# Patient Record
Sex: MALE | Race: ASIAN | NOT HISPANIC OR LATINO | Employment: UNEMPLOYED | URBAN - METROPOLITAN AREA
[De-identification: names, ages, dates, MRNs, and addresses within clinical notes are randomized per-mention and may not be internally consistent; named-entity substitution may affect disease eponyms.]

---

## 2018-08-28 ENCOUNTER — RADIANT APPOINTMENT (OUTPATIENT)
Dept: GENERAL RADIOLOGY | Facility: CLINIC | Age: 38
End: 2018-08-28
Attending: NURSE PRACTITIONER

## 2018-08-28 ENCOUNTER — OFFICE VISIT (OUTPATIENT)
Dept: URGENT CARE | Facility: URGENT CARE | Age: 38
End: 2018-08-28

## 2018-08-28 VITALS
RESPIRATION RATE: 16 BRPM | OXYGEN SATURATION: 98 % | DIASTOLIC BLOOD PRESSURE: 100 MMHG | HEART RATE: 67 BPM | TEMPERATURE: 97.7 F | SYSTOLIC BLOOD PRESSURE: 146 MMHG | WEIGHT: 153 LBS

## 2018-08-28 DIAGNOSIS — R10.13 ABDOMINAL PAIN, EPIGASTRIC: ICD-10-CM

## 2018-08-28 DIAGNOSIS — S30.812A PENIS ABRASION, INITIAL ENCOUNTER: ICD-10-CM

## 2018-08-28 DIAGNOSIS — K21.9 GASTROESOPHAGEAL REFLUX DISEASE, ESOPHAGITIS PRESENCE NOT SPECIFIED: Primary | ICD-10-CM

## 2018-08-28 LAB
ALBUMIN UR-MCNC: NEGATIVE MG/DL
APPEARANCE UR: CLEAR
BILIRUB UR QL STRIP: NEGATIVE
COLOR UR AUTO: YELLOW
GLUCOSE UR STRIP-MCNC: NEGATIVE MG/DL
HGB UR QL STRIP: NEGATIVE
KETONES UR STRIP-MCNC: NEGATIVE MG/DL
LEUKOCYTE ESTERASE UR QL STRIP: NEGATIVE
NITRATE UR QL: NEGATIVE
PH UR STRIP: 6.5 PH (ref 5–7)
RBC #/AREA URNS AUTO: NORMAL /HPF
SOURCE: NORMAL
SP GR UR STRIP: <=1.005 (ref 1–1.03)
UROBILINOGEN UR STRIP-ACNC: 0.2 EU/DL (ref 0.2–1)
WBC #/AREA URNS AUTO: NORMAL /HPF

## 2018-08-28 PROCEDURE — 74019 RADEX ABDOMEN 2 VIEWS: CPT | Mod: FY

## 2018-08-28 PROCEDURE — 83013 H PYLORI (C-13) BREATH: CPT | Mod: 90 | Performed by: NURSE PRACTITIONER

## 2018-08-28 PROCEDURE — 99000 SPECIMEN HANDLING OFFICE-LAB: CPT | Performed by: NURSE PRACTITIONER

## 2018-08-28 PROCEDURE — 99204 OFFICE O/P NEW MOD 45 MIN: CPT | Performed by: NURSE PRACTITIONER

## 2018-08-28 PROCEDURE — 81001 URINALYSIS AUTO W/SCOPE: CPT | Performed by: NURSE PRACTITIONER

## 2018-08-28 ASSESSMENT — ENCOUNTER SYMPTOMS
BLOOD IN STOOL: 0
DIARRHEA: 0
ACTIVITY CHANGE: 0
APPETITE CHANGE: 1
ABDOMINAL DISTENTION: 1
FEVER: 0
FATIGUE: 1
MUSCULOSKELETAL NEGATIVE: 1
NAUSEA: 1
ABDOMINAL PAIN: 1
CONSTIPATION: 0
DYSURIA: 0

## 2018-08-28 ASSESSMENT — PAIN SCALES - GENERAL: PAINLEVEL: MODERATE PAIN (5)

## 2018-08-28 NOTE — MR AVS SNAPSHOT
After Visit Summary   8/28/2018    Darryl Melo    MRN: [id number]           Patient Information     Date Of Birth          1980        Visit Information        Provider Department      8/28/2018 5:20 PM Juan C Salcedo APRN [facility]        Today's Diagnoses     Gastroesophageal reflux disease, esophagitis presence not specified    -  1    Penis abrasion, initial encounter        Abdominal pain, epigastric          Care Instructions      GERD (Adult)    The esophagus is a tube that carries food from the mouth to the stomach. A valve at the lower end of the esophagus prevents stomach acid from flowing upward. When this valve doesn't work properly, stomach contents may repeatedly flow back up (reflux) into the esophagus. This is called gastroesophageal reflux disease (GERD). GERD can irritate the esophagus. It can cause problems with swallowing or breathing. In severe cases, GERD can cause recurrent pneumonia or other serious problems.  Symptoms of reflux include burning, pressure or sharp pain in the upper abdomen or mid to lower chest. The pain can spread to the neck, back, or shoulder. There may be belching, an acid taste in the back of the throat, chronic cough, or sore throat or hoarseness. GERD symptoms often occur during the day after a big meal. They can also occur at night when lying down.   Home care  Lifestyle changes can help reduce symptoms. If needed, medicines may be prescribed. Symptoms often improve with treatment, but if treatment is stopped, the symptoms often return after a few months. So most persons with GERD will need to continue treatment.  Lifestyle changes    Limit or avoid fatty, fried, and spicy foods, as well as coffee, chocolate, mint, and foods with high acid content such as tomatoes and citrus fruit and juices (orange, grapefruit, lemon).    Don t eat large meals, especially at night. Frequent, smaller meals are best. Do not lie down  "right after eating. And don t eat anything 3 hours before going to bed.    Avoid drinking alcohol and smoking. As much as possible, stay away from second hand smoke.    If you are overweight, losing weight will reduce symptoms.     Avoid wearing tight clothing around your stomach area.    If your symptoms occur during sleep, use a foam wedge to elevate your upper body (not just your head.) Or, place 4\" blocks under the head of your bed.  Medicines  If needed, medicines can help relieve the symptoms of GERD and prevent damage to the esophagus. Discuss a medicine plan with your healthcare provider. This may include one or more of the following medicines:    Antacids to help neutralize the normal acids in your stomach.    Acid blockers (H2 blockers) to decrease acid production.    Acid inhibitors (PPIs) to decrease acid production in a different way than the blockers. They may work better, but can take a little longer to take effect.  Take an antacid 30-60 minutes after eating and at bedtime, but not at the same time as an acid blocker.  Try not to take medicines such as ibuprofen and aspirin. If you are taking aspirin for your heart or other medical reasons, talk to your healthcare provider about stopping it.  Follow-up care  Follow up with your healthcare provider or as advised by our staff.  When to seek medical advice  Call your healthcare provider if any of the following occur:    Stomach pain gets worse or moves to the lower right abdomen (appendix area)    Chest pain appears or gets worse, or spreads to the back, neck, shoulder, or arm    Frequent vomiting (can t keep down liquids)    Blood in the stool or vomit (red or black in color)    Feeling weak or dizzy    Fever of 100.4 F (38 C) or higher, or as directed by your healthcare provider  Date Last Reviewed: 6/23/2015 2000-2017 [employer]. [address] [place] [address], [place] [address]. All rights reserved. This information is not intended as a " substitute for professional medical care. Always follow your healthcare professional's instructions.        Lifestyle Changes for Controlling GERD  When you have GERD, stomach acid feels as if it s backing up toward your mouth. Whether or not you take medicine to control your GERD, your symptoms can often be improved with lifestyle changes. Talk to your healthcare provider about the following suggestions. These suggestions may help you get relief from your symptoms.      Raise your head  Reflux is more likely to strike when you re lying down flat, because stomach fluid can flow backward more easily. Raising the head of your bed 4 to 6 inches can help. To do this:    Slide blocks or books under the legs at the head of your bed. Or, place a wedge under the mattress. [employer] can make a suitable wedge for you. The wedge should run from your waist to the top of your head.    Don t just prop your head on several pillows. This increases pressure on your stomach. It can make GERD worse.  Watch your eating habits  Certain foods may increase the acid in your stomach or relax the lower esophageal sphincter. This makes GERD more likely. It s best to avoid the following if they cause you symptoms:    Coffee, tea, and carbonated drinks (with and without caffeine)    Fatty, fried, or spicy food    Mint, chocolate, onions, and tomatoes    Peppermint    Any other foods that seem to irritate your stomach or cause you pain  Relieve the pressure  Tips include the following:    Eat smaller meals, even if you have to eat more often.    Don t lie down right after you eat. Wait a few hours for your stomach to empty.    Avoid tight belts and tight-fitting clothes.    Lose excess weight.  Tobacco and alcohol  Avoid smoking tobacco and drinking alcohol. They can make GERD symptoms worse.  Date Last Reviewed: 7/1/2016 2000-2017 [employer]. [address] [place] [address], [place] [address]. All rights reserved. This  information is not intended as a substitute for professional medical care. Always follow your healthcare professional's instructions.        Medicines for GERD  Gastroesophageal reflux disease (GERD) can be treated with medicine. This may be done with a medicine you can buy over the counter. Or it may be done with a medicine that your healthcare provider has to prescribe. In some cases, both types may be used. Your provider will tell you what is best for your symptoms.  Antacids  Antacids work to weaken the acid in your stomach and can give you quick relief. You can buy many of them with no prescription. Antacids can be high in sodium. This may be a problem if you have high blood pressure. Some antacids also have aluminum. This should be avoided if you have long-term (chronic) kidney disease. So check with your provider first. Take antacids only when you need to, as advised by your provider.  Side effects: Constipation, diarrhea. If you take too much medicine, it can cause calcium to build up.   H-2 blockers  H-2 blockers cause the stomach to make less acid. They are often used both on demand as symptoms occur, and daily to keep symptoms away. Your provider may prescribe them if antacids don t work for you. You can buy some of them over the counter. These come in a lower dosage.  Side effects: Confusion in older adults  Proton-pump inhibitors  These also cause the stomach to make less acid. They reduce stomach acid more than H-2 blockers. They may be used for a short time, or longer to treat certain conditions. You can buy some of them over the counter. Or your provider may prescribe them. They help control GERD symptoms.  Side effects: Belly (abdominal) pain, diarrhea, upset stomach (nausea). Possible other side effects linked to long-term use and high doses.  Prokinetics  These medicines affect the movement of the digestive tract. They may be recommended if your stomach is emptying too slowly. But in most cases  they are not recommended for treating GERD.   Side effects: Tiredness, depression, anxiety, problems with physical movement, belly cramps, constipation, diarrhea, a jittery feeling  Medicines to avoid  Don t take aspirin without your provider s approval. And don t take a nonsteroidal anti-inflammatory drug (NSAID), such as ibuprofen. These reduce the protective lining of your stomach. This can lead to more GERD symptoms. Check with your provider or pharmacist before taking a new medicine.   Date Last Reviewed: 7/1/2016 2000-2017 [employer]. [address] [place], [place] [id number]. All rights reserved. This information is not intended as a substitute for professional medical care. Always follow your healthcare professional's instructions.        Understanding H. pylori and Ulcers     An ulcer can form in two areas of the digestive tract; the stomach and the duodenum (where the stomach meets the small intestine).     Traditionally, ulcers, or sores in the lining of your digestive tract, were thought to be caused by too much spicy food, stress, or an anxious personality. We now know that most ulcers are probably due to infection with bacteria known as Helicobacter pylori (H. pylori).  Common ulcer symptoms  These include the following:    Burning, cramping, or hungerlike pain in the stomach area, often 1 to 3 hours after a meal or in the middle of the night    Pain that gets better or worse with eating    Nausea or vomiting    Black, tarry, or bloody stools (which means the ulcer is bleeding)  Or you may have no symptoms.  Your evaluation  An evaluation by your healthcare provider can show if you have an ulcer and determine whether it was caused by H. pylori. Your healthcare provider may ask you questions, examine you, and possibly do some tests. These may include:    A special X-ray called an upper gastrointestinal series, to help locate an ulcer. Before the test, you drink a chalky liquid, called  barium. This liquid helps the ulcer show up on the X-ray.    An endoscopic exam, done with a long tube with a camera on the end. The tube is passed through your mouth into your stomach, and allows the healthcare provider to get a closer look at your ulcer. You will be lightly sedated for this procedure. Your healthcare provider can also take a tissue sample to test for H. pylori.    Blood, stool, and breath tests are also available to show whether you have H. pylori in your digestive tract.  Your treatment  To kill H. pylori so your ulcer can heal, your healthcare provider will probably prescribe antibiotics. Other ulcer medicines that help reduce stomach acid may also be prescribed as well. Testing after treatment may be recommended to be sure the H. pylori infection is gone. Usually, killing H. pylori helps keep the ulcer from returning. However, you can develop ulcers more than once in your lifetime.   Date Last Reviewed: 7/1/2016 2000-2017 [employer]. [address] [place] [address]. All rights reserved. This information is not intended as a substitute for professional medical care. Always follow your healthcare professional's instructions.                Follow-ups after your visit        Follow-up notes from your care team     Return in about 2 weeks (around 9/11/2018), or if symptoms worsen or fail to improve.      Who to contact     If you have questions or need follow up information about today's clinic visit or your schedule please contact [facility] directly at [phone].  Normal or non-critical lab and imaging results will be communicated to you by MyChart, letter or phone within 4 business days after the clinic has received the results. If you do not hear from us within 7 days, please contact the clinic through MyChart or phone. If you have a critical or abnormal lab result, we will notify you by phone as soon as possible.  Submit refill requests  "through [employer] or call your pharmacy and they will forward the refill request to us. Please allow 3 business days for your refill to be completed.          Additional Information About Your Visit        [employer] Information     [employer] lets you send messages to your doctor, view your test results, renew your prescriptions, schedule appointments and more. To sign up, go to [email or web address] . Click on \"Log in\" on the left side of the screen, which will take you to the Welcome page. Then click on \"Sign up Now\" on the right side of the page.     You will be asked to enter the access code listed below, as well as some personal information. Please follow the directions to create your username and password.     Your access code is: [email or web address]  Expires: 2018  7:43 PM     Your access code will  in 90 days. If you need help or a new code, please call your [facility] clinic or [phone].        Care EveryWhere ID     This is your Care EveryWhere ID. This could be used by other organizations to access your [facility] medical records  [id number]        Your Vitals Were     Pulse Temperature Respirations Pulse Oximetry          67 97.7  F (36.5  C) (Oral) 16 98%         Blood Pressure from Last 3 Encounters:   18 (!) 146/100   11 102/60    Weight from Last 3 Encounters:   18 153 lb (69.4 kg)   11 157 lb (71.2 kg)              We Performed the Following     Helicobacter pylori Breath Test Adult     UA with Microscopic reflex to Culture          Today's Medication Changes          These changes are accurate as of 18  7:43 PM.  If you have any questions, ask your nurse or doctor.               These medicines have changed or have updated prescriptions.        Dose/Directions    * ranitidine 300 MG tablet   Commonly known as:  ZANTAC   This may have changed:  You were already taking a medication with the same name, and this prescription was added. Make sure you understand how and " when to take each.   Used for:  Gastroesophageal reflux disease, esophagitis presence not specified   Changed by:  Juan C Salcedo APRN CNP        Dose:  300 mg   Take 1 tablet (300 mg) by mouth At Bedtime   Quantity:  30 tablet   Refills:  1       * RANITIDINE HCL PO   This may have changed:  Another medication with the same name was added. Make sure you understand how and when to take each.   Used for:  Abdominal pain, epigastric   Changed by:  AliBrittumasanena Bishara APRN CNP        Refills:  0       * Notice:  This list has 2 medication(s) that are the same as other medications prescribed for you. Read the directions carefully, and ask your doctor or other care provider to review them with you.         Where to get your medicines      These medications were sent to [facility] [place] - [address]  [address], [facility] [address]     Phone:  [phone]     ranitidine 300 MG tablet                Primary Care Provider Fax #    Physician No Ref-Primary [phone]       No address on file        Equal Access to Services     [facility]: Hadii cordell monroy hadasho Soomaali, waaxda luqadaha, qaybta kaalmada adeegyada, apolinar lara . So [facility] [phone].    ATENCIÓN: Si habla español, tiene a aquino disposición servicios gratuitos de asistencia lingüística. Llame al [phone].    We comply with applicable federal civil rights laws and [place] laws. We do not discriminate on the basis of race, color, national origin, age, disability, sex, sexual orientation, or gender identity.            Thank you!     Thank you for choosing [facility]  for your care. Our goal is always to provide you with excellent care. Hearing back from our patients is one way we can continue to improve our services. Please take a few minutes to complete the written survey that you may receive in the mail after your visit with us. Thank you!              Your Updated Medication List - Protect others around you: Learn how to safely use, store and throw away your medicines at www.disposemymeds.org.          This list is accurate as of 8/28/18  7:43 PM.  Always use your most recent med list.                   Brand Name Dispense Instructions for use Diagnosis    NO ACTIVE MEDICATIONS       GERD (gastroesophageal reflux disease)       omeprazole 40 MG capsule    priLOSEC    30 capsule    Take 1 capsule by mouth daily.    GERD (gastroesophageal reflux disease)       PEPTO-BISMOL PO       Abdominal pain, epigastric       * ranitidine 300 MG tablet    ZANTAC    30 tablet    Take 1 tablet (300 mg) by mouth At Bedtime    Gastroesophageal reflux disease, esophagitis presence not specified       * RANITIDINE HCL PO       Abdominal pain, epigastric       * Notice:  This list has 2 medication(s) that are the same as other medications prescribed for you. Read the directions carefully, and ask your doctor or other care provider to review them with you.

## 2018-08-28 NOTE — PROGRESS NOTES
SUBJECTIVE:   Darryl Melo is a 38 year old male presenting with a chief complaint of   Chief Complaint   Patient presents with     Abdominal Pain     x1 week with pressure     Nausea       He is a new patient of [facility].    Abdominal Pain    Location: generalized   Radiation: None.    Pain character: pressure and intermittent stabbing,   Severity: 6 on a scale of 1-10.    Duration: 1 week   Course of Illness: slowly progressive.  Exacerbated by: hunger, ETOH and soda worsens symptoms   Relieved by: food, eating meals make symptoms improve  Associated Symptoms: nausea, bloating, belching and headache.  Female : Not applicable  Surgical History: none  Patient does endorse history of H. Pylori, concern for this reoccurrence, requests testing. Patient does note no PCP due to insurance coverage. He does endorse taking PeptoBismal OTC, Prilosec 20mg once daily OTC for 4 days and then switched to Ranitidine for 4 days. Reports taking fiber supplement with a large soft BM today.   Denies any hematemesis and hematochezia.   Of note, also requesting evaluation of a penile skin tear, no STD concerns    Review of Systems   Constitutional: Positive for appetite change and fatigue. Negative for activity change and fever.   HENT: Negative.    Gastrointestinal: Positive for abdominal distention, abdominal pain and nausea. Negative for blood in stool, constipation and diarrhea.        Diffuse discomfort   Genitourinary: Negative for dysuria.   Musculoskeletal: Negative.    Skin: Negative.    All other systems reviewed and are negative.      History reviewed. No pertinent past medical history.  History reviewed. No pertinent family history.  Current Outpatient Prescriptions   Medication Sig Dispense Refill     Bismuth Subsalicylate (PEPTO-BISMOL PO)        ranitidine (ZANTAC) 300 MG tablet Take 1 tablet (300 mg) by mouth At Bedtime 30 tablet 1     RANITIDINE HCL PO        NO ACTIVE MEDICATIONS        omeprazole (PRILOSEC) 40 MG  capsule Take 1 capsule by mouth daily. (Patient not taking: Reported on 8/28/2018) 30 capsule 1     Social History   Substance Use Topics     Smoking status: Never Smoker     Smokeless tobacco: Never Used     Alcohol use Not on file       OBJECTIVE  BP (!) 146/100 (BP Location: Left arm, Patient Position: Chair, Cuff Size: Adult Regular)  Pulse 67  Temp 97.7  F (36.5  C) (Oral)  Resp 16  Wt 153 lb (69.4 kg)  SpO2 98%    Physical Exam   Constitutional: He is oriented to person, place, and time. No distress.   Cardiovascular: Normal rate, regular rhythm, normal heart sounds and intact distal pulses.  Exam reveals no gallop.    No murmur heard.  Pulmonary/Chest: Effort normal and breath sounds normal. No respiratory distress. He has no wheezes.   Abdominal: Soft. He exhibits distension. He exhibits no mass. There is tenderness. There is no rebound and no guarding.   Hyperactive bowel sounds in all four quadrants.   Diffuse tenderness throughout abdomen noted with deep palpation     Genitourinary: Penis normal.   Genitourinary Comments: Penis shaft with 1x3cm area of healing abrasion, no erythema or edema noted       Neurological: He is alert and oriented to person, place, and time.   Skin: Skin is warm and dry.   Psychiatric: He has a normal mood and affect.       Labs:  Results for orders placed or performed in visit on 08/28/18 (from the past 24 hour(s))   UA with Microscopic reflex to Culture   Result Value Ref Range    Color Urine Yellow     Appearance Urine Clear     Glucose Urine Negative NEG^Negative mg/dL    Bilirubin Urine Negative NEG^Negative    Ketones Urine Negative NEG^Negative mg/dL    Specific Gravity Urine <=1.005 1.003 - 1.035    pH Urine 6.5 5.0 - 7.0 pH    Protein Albumin Urine Negative NEG^Negative mg/dL    Urobilinogen Urine 0.2 0.2 - 1.0 EU/dL    Nitrite Urine Negative NEG^Negative    Blood Urine Negative NEG^Negative    Leukocyte Esterase Urine Negative NEG^Negative    Source Midstream  Urine     WBC Urine 0 - 5 OTO5^0 - 5 /HPF    RBC Urine O - 2 OTO2^O - 2 /HPF         ASSESSMENT:    ICD-10-CM    1. Gastroesophageal reflux disease, esophagitis presence not specified K21.9 ranitidine (ZANTAC) 300 MG tablet   2. Penis abrasion, initial encounter S30.812A    3. Abdominal pain, epigastric R10.13 Bismuth Subsalicylate (PEPTO-BISMOL PO)     RANITIDINE HCL PO     Helicobacter pylori Breath Test Adult     UA with Microscopic reflex to Culture     XR Abdomen 2 Views          Medical Decision Making:    Differential Diagnosis:  Abdominal Pain: GERD/Ulcer, UTI and Non Specific    Serious Comorbid Conditions:  Adult:  None    PLAN: Discussed and reviewed lab findings. Negative urine and imaging. Clinical presentation most consistent with GERD, will treat with ranitidine 300gm at bedtime. Advised patient to monitor diet, avoid ETOH and soda. Discussed need to remain hydrated with a progressive bland diet and advance diet as tolerated.  Encouraged follow up for H. Pylori results. Advised that at this time no acute abdominal findings noted, however did discuss  red flag symptoms and when to return for follow up.   Also, briefly advised ongoing basic skin cares for penial abrasion.Patient agreed to the plan of care with no further questions or concerns.     Followup: If not improving or if condition worsens, follow up with your Primary Care Provider in the next 1-2 weeks or sooner as needed.    EVERT Eagle, CNP      Patient Instructions       GERD (Adult)    The esophagus is a tube that carries food from the mouth to the stomach. A valve at the lower end of the esophagus prevents stomach acid from flowing upward. When this valve doesn't work properly, stomach contents may repeatedly flow back up (reflux) into the esophagus. This is called gastroesophageal reflux disease (GERD). GERD can irritate the esophagus. It can cause problems with swallowing or breathing. In severe cases, GERD can cause  "recurrent pneumonia or other serious problems.  Symptoms of reflux include burning, pressure or sharp pain in the upper abdomen or mid to lower chest. The pain can spread to the neck, back, or shoulder. There may be belching, an acid taste in the back of the throat, chronic cough, or sore throat or hoarseness. GERD symptoms often occur during the day after a big meal. They can also occur at night when lying down.   Home care  Lifestyle changes can help reduce symptoms. If needed, medicines may be prescribed. Symptoms often improve with treatment, but if treatment is stopped, the symptoms often return after a few months. So most persons with GERD will need to continue treatment.  Lifestyle changes    Limit or avoid fatty, fried, and spicy foods, as well as coffee, chocolate, mint, and foods with high acid content such as tomatoes and citrus fruit and juices (orange, grapefruit, lemon).    Don t eat large meals, especially at night. Frequent, smaller meals are best. Do not lie down right after eating. And don t eat anything 3 hours before going to bed.    Avoid drinking alcohol and smoking. As much as possible, stay away from second hand smoke.    If you are overweight, losing weight will reduce symptoms.     Avoid wearing tight clothing around your stomach area.    If your symptoms occur during sleep, use a foam wedge to elevate your upper body (not just your head.) Or, place 4\" blocks under the head of your bed.  Medicines  If needed, medicines can help relieve the symptoms of GERD and prevent damage to the esophagus. Discuss a medicine plan with your healthcare provider. This may include one or more of the following medicines:    Antacids to help neutralize the normal acids in your stomach.    Acid blockers (H2 blockers) to decrease acid production.    Acid inhibitors (PPIs) to decrease acid production in a different way than the blockers. They may work better, but can take a little longer to take effect.  Take an " antacid 30-60 minutes after eating and at bedtime, but not at the same time as an acid blocker.  Try not to take medicines such as ibuprofen and aspirin. If you are taking aspirin for your heart or other medical reasons, talk to your healthcare provider about stopping it.  Follow-up care  Follow up with your healthcare provider or as advised by our staff.  When to seek medical advice  Call your healthcare provider if any of the following occur:    Stomach pain gets worse or moves to the lower right abdomen (appendix area)    Chest pain appears or gets worse, or spreads to the back, neck, shoulder, or arm    Frequent vomiting (can t keep down liquids)    Blood in the stool or vomit (red or black in color)    Feeling weak or dizzy    Fever of 100.4 F (38 C) or higher, or as directed by your healthcare provider  Date Last Reviewed: 6/23/2015 2000-2017 [employer]. [address] [place], [place] [id number]. All rights reserved. This information is not intended as a substitute for professional medical care. Always follow your healthcare professional's instructions.        Lifestyle Changes for Controlling GERD  When you have GERD, stomach acid feels as if it s backing up toward your mouth. Whether or not you take medicine to control your GERD, your symptoms can often be improved with lifestyle changes. Talk to your healthcare provider about the following suggestions. These suggestions may help you get relief from your symptoms.      Raise your head  Reflux is more likely to strike when you re lying down flat, because stomach fluid can flow backward more easily. Raising the head of your bed 4 to 6 inches can help. To do this:    Slide blocks or books under the legs at the head of your bed. Or, place a wedge under the mattress. [employer] can make a suitable wedge for you. The wedge should run from your waist to the top of your head.    Don t just prop your head on several pillows. This increases  pressure on your stomach. It can make GERD worse.  Watch your eating habits  Certain foods may increase the acid in your stomach or relax the lower esophageal sphincter. This makes GERD more likely. It s best to avoid the following if they cause you symptoms:    Coffee, tea, and carbonated drinks (with and without caffeine)    Fatty, fried, or spicy food    Mint, chocolate, onions, and tomatoes    Peppermint    Any other foods that seem to irritate your stomach or cause you pain  Relieve the pressure  Tips include the following:    Eat smaller meals, even if you have to eat more often.    Don t lie down right after you eat. Wait a few hours for your stomach to empty.    Avoid tight belts and tight-fitting clothes.    Lose excess weight.  Tobacco and alcohol  Avoid smoking tobacco and drinking alcohol. They can make GERD symptoms worse.  Date Last Reviewed: 7/1/2016 2000-2017 [employer]. [address] [place] [address]. All rights reserved. This information is not intended as a substitute for professional medical care. Always follow your healthcare professional's instructions.        Medicines for GERD  Gastroesophageal reflux disease (GERD) can be treated with medicine. This may be done with a medicine you can buy over the counter. Or it may be done with a medicine that your healthcare provider has to prescribe. In some cases, both types may be used. Your provider will tell you what is best for your symptoms.  Antacids  Antacids work to weaken the acid in your stomach and can give you quick relief. You can buy many of them with no prescription. Antacids can be high in sodium. This may be a problem if you have high blood pressure. Some antacids also have aluminum. This should be avoided if you have long-term (chronic) kidney disease. So check with your provider first. Take antacids only when you need to, as advised by your provider.  Side effects: Constipation, diarrhea. If you take too  much medicine, it can cause calcium to build up.   H-2 blockers  H-2 blockers cause the stomach to make less acid. They are often used both on demand as symptoms occur, and daily to keep symptoms away. Your provider may prescribe them if antacids don t work for you. You can buy some of them over the counter. These come in a lower dosage.  Side effects: Confusion in older adults  Proton-pump inhibitors  These also cause the stomach to make less acid. They reduce stomach acid more than H-2 blockers. They may be used for a short time, or longer to treat certain conditions. You can buy some of them over the counter. Or your provider may prescribe them. They help control GERD symptoms.  Side effects: Belly (abdominal) pain, diarrhea, upset stomach (nausea). Possible other side effects linked to long-term use and high doses.  Prokinetics  These medicines affect the movement of the digestive tract. They may be recommended if your stomach is emptying too slowly. But in most cases they are not recommended for treating GERD.   Side effects: Tiredness, depression, anxiety, problems with physical movement, belly cramps, constipation, diarrhea, a jittery feeling  Medicines to avoid  Don t take aspirin without your provider s approval. And don t take a nonsteroidal anti-inflammatory drug (NSAID), such as ibuprofen. These reduce the protective lining of your stomach. This can lead to more GERD symptoms. Check with your provider or pharmacist before taking a new medicine.   Date Last Reviewed: 7/1/2016 2000-2017 [employer]. [address] [place] [address], [place] [address]. All rights reserved. This information is not intended as a substitute for professional medical care. Always follow your healthcare professional's instructions.        Understanding H. pylori and Ulcers     An ulcer can form in two areas of the digestive tract; the stomach and the duodenum (where the stomach meets the small intestine).      Traditionally, ulcers, or sores in the lining of your digestive tract, were thought to be caused by too much spicy food, stress, or an anxious personality. We now know that most ulcers are probably due to infection with bacteria known as Helicobacter pylori (H. pylori).  Common ulcer symptoms  These include the following:    Burning, cramping, or hungerlike pain in the stomach area, often 1 to 3 hours after a meal or in the middle of the night    Pain that gets better or worse with eating    Nausea or vomiting    Black, tarry, or bloody stools (which means the ulcer is bleeding)  Or you may have no symptoms.  Your evaluation  An evaluation by your healthcare provider can show if you have an ulcer and determine whether it was caused by H. pylori. Your healthcare provider may ask you questions, examine you, and possibly do some tests. These may include:    A special X-ray called an upper gastrointestinal series, to help locate an ulcer. Before the test, you drink a chalky liquid, called barium. This liquid helps the ulcer show up on the X-ray.    An endoscopic exam, done with a long tube with a camera on the end. The tube is passed through your mouth into your stomach, and allows the healthcare provider to get a closer look at your ulcer. You will be lightly sedated for this procedure. Your healthcare provider can also take a tissue sample to test for H. pylori.    Blood, stool, and breath tests are also available to show whether you have H. pylori in your digestive tract.  Your treatment  To kill H. pylori so your ulcer can heal, your healthcare provider will probably prescribe antibiotics. Other ulcer medicines that help reduce stomach acid may also be prescribed as well. Testing after treatment may be recommended to be sure the H. pylori infection is gone. Usually, killing H. pylori helps keep the ulcer from returning. However, you can develop ulcers more than once in your lifetime.   Date Last Reviewed:  7/1/2016 2000-2017 [employer]. [address] [place] [address], [place] [address]. All rights reserved. This information is not intended as a substitute for professional medical care. Always follow your healthcare professional's instructions.

## 2018-08-29 NOTE — PATIENT INSTRUCTIONS
"  GERD (Adult)    The esophagus is a tube that carries food from the mouth to the stomach. A valve at the lower end of the esophagus prevents stomach acid from flowing upward. When this valve doesn't work properly, stomach contents may repeatedly flow back up (reflux) into the esophagus. This is called gastroesophageal reflux disease (GERD). GERD can irritate the esophagus. It can cause problems with swallowing or breathing. In severe cases, GERD can cause recurrent pneumonia or other serious problems.  Symptoms of reflux include burning, pressure or sharp pain in the upper abdomen or mid to lower chest. The pain can spread to the neck, back, or shoulder. There may be belching, an acid taste in the back of the throat, chronic cough, or sore throat or hoarseness. GERD symptoms often occur during the day after a big meal. They can also occur at night when lying down.   Home care  Lifestyle changes can help reduce symptoms. If needed, medicines may be prescribed. Symptoms often improve with treatment, but if treatment is stopped, the symptoms often return after a few months. So most persons with GERD will need to continue treatment.  Lifestyle changes    Limit or avoid fatty, fried, and spicy foods, as well as coffee, chocolate, mint, and foods with high acid content such as tomatoes and citrus fruit and juices (orange, grapefruit, lemon).    Don t eat large meals, especially at night. Frequent, smaller meals are best. Do not lie down right after eating. And don t eat anything 3 hours before going to bed.    Avoid drinking alcohol and smoking. As much as possible, stay away from second hand smoke.    If you are overweight, losing weight will reduce symptoms.     Avoid wearing tight clothing around your stomach area.    If your symptoms occur during sleep, use a foam wedge to elevate your upper body (not just your head.) Or, place 4\" blocks under the head of your bed.  Medicines  If needed, medicines can help relieve " the symptoms of GERD and prevent damage to the esophagus. Discuss a medicine plan with your healthcare provider. This may include one or more of the following medicines:    Antacids to help neutralize the normal acids in your stomach.    Acid blockers (H2 blockers) to decrease acid production.    Acid inhibitors (PPIs) to decrease acid production in a different way than the blockers. They may work better, but can take a little longer to take effect.  Take an antacid 30-60 minutes after eating and at bedtime, but not at the same time as an acid blocker.  Try not to take medicines such as ibuprofen and aspirin. If you are taking aspirin for your heart or other medical reasons, talk to your healthcare provider about stopping it.  Follow-up care  Follow up with your healthcare provider or as advised by our staff.  When to seek medical advice  Call your healthcare provider if any of the following occur:    Stomach pain gets worse or moves to the lower right abdomen (appendix area)    Chest pain appears or gets worse, or spreads to the back, neck, shoulder, or arm    Frequent vomiting (can t keep down liquids)    Blood in the stool or vomit (red or black in color)    Feeling weak or dizzy    Fever of 100.4 F (38 C) or higher, or as directed by your healthcare provider  Date Last Reviewed: 6/23/2015 2000-2017 [employer]. [address] [place] [address], [place] [address]. All rights reserved. This information is not intended as a substitute for professional medical care. Always follow your healthcare professional's instructions.        Lifestyle Changes for Controlling GERD  When you have GERD, stomach acid feels as if it s backing up toward your mouth. Whether or not you take medicine to control your GERD, your symptoms can often be improved with lifestyle changes. Talk to your healthcare provider about the following suggestions. These suggestions may help you get relief from your symptoms.      Raise your  head  Reflux is more likely to strike when you re lying down flat, because stomach fluid can flow backward more easily. Raising the head of your bed 4 to 6 inches can help. To do this:    Slide blocks or books under the legs at the head of your bed. Or, place a wedge under the mattress. Many foam stores can make a suitable wedge for you. The wedge should run from your waist to the top of your head.    Don t just prop your head on several pillows. This increases pressure on your stomach. It can make GERD worse.  Watch your eating habits  Certain foods may increase the acid in your stomach or relax the lower esophageal sphincter. This makes GERD more likely. It s best to avoid the following if they cause you symptoms:    Coffee, tea, and carbonated drinks (with and without caffeine)    Fatty, fried, or spicy food    Mint, chocolate, onions, and tomatoes    Peppermint    Any other foods that seem to irritate your stomach or cause you pain  Relieve the pressure  Tips include the following:    Eat smaller meals, even if you have to eat more often.    Don t lie down right after you eat. Wait a few hours for your stomach to empty.    Avoid tight belts and tight-fitting clothes.    Lose excess weight.  Tobacco and alcohol  Avoid smoking tobacco and drinking alcohol. They can make GERD symptoms worse.  Date Last Reviewed: 7/1/2016 2000-2017 [employer]. [address] [place] [address], [place] [address]. All rights reserved. This information is not intended as a substitute for professional medical care. Always follow your healthcare professional's instructions.        Medicines for GERD  Gastroesophageal reflux disease (GERD) can be treated with medicine. This may be done with a medicine you can buy over the counter. Or it may be done with a medicine that your healthcare provider has to prescribe. In some cases, both types may be used. Your provider will tell you what is best for your symptoms.  Antacids  Antacids  work to weaken the acid in your stomach and can give you quick relief. You can buy many of them with no prescription. Antacids can be high in sodium. This may be a problem if you have high blood pressure. Some antacids also have aluminum. This should be avoided if you have long-term (chronic) kidney disease. So check with your provider first. Take antacids only when you need to, as advised by your provider.  Side effects: Constipation, diarrhea. If you take too much medicine, it can cause calcium to build up.   H-2 blockers  H-2 blockers cause the stomach to make less acid. They are often used both on demand as symptoms occur, and daily to keep symptoms away. Your provider may prescribe them if antacids don t work for you. You can buy some of them over the counter. These come in a lower dosage.  Side effects: Confusion in older adults  Proton-pump inhibitors  These also cause the stomach to make less acid. They reduce stomach acid more than H-2 blockers. They may be used for a short time, or longer to treat certain conditions. You can buy some of them over the counter. Or your provider may prescribe them. They help control GERD symptoms.  Side effects: Belly (abdominal) pain, diarrhea, upset stomach (nausea). Possible other side effects linked to long-term use and high doses.  Prokinetics  These medicines affect the movement of the digestive tract. They may be recommended if your stomach is emptying too slowly. But in most cases they are not recommended for treating GERD.   Side effects: Tiredness, depression, anxiety, problems with physical movement, belly cramps, constipation, diarrhea, a jittery feeling  Medicines to avoid  Don t take aspirin without your provider s approval. And don t take a nonsteroidal anti-inflammatory drug (NSAID), such as ibuprofen. These reduce the protective lining of your stomach. This can lead to more GERD symptoms. Check with your provider or pharmacist before taking a new medicine.    Date Last Reviewed: 7/1/2016 2000-2017 [employer]. [address] [place] [address], [place] [address]. All rights reserved. This information is not intended as a substitute for professional medical care. Always follow your healthcare professional's instructions.        Understanding H. pylori and Ulcers     An ulcer can form in two areas of the digestive tract; the stomach and the duodenum (where the stomach meets the small intestine).     Traditionally, ulcers, or sores in the lining of your digestive tract, were thought to be caused by too much spicy food, stress, or an anxious personality. We now know that most ulcers are probably due to infection with bacteria known as Helicobacter pylori (H. pylori).  Common ulcer symptoms  These include the following:    Burning, cramping, or hungerlike pain in the stomach area, often 1 to 3 hours after a meal or in the middle of the night    Pain that gets better or worse with eating    Nausea or vomiting    Black, tarry, or bloody stools (which means the ulcer is bleeding)  Or you may have no symptoms.  Your evaluation  An evaluation by your healthcare provider can show if you have an ulcer and determine whether it was caused by H. pylori. Your healthcare provider may ask you questions, examine you, and possibly do some tests. These may include:    A special X-ray called an upper gastrointestinal series, to help locate an ulcer. Before the test, you drink a chalky liquid, called barium. This liquid helps the ulcer show up on the X-ray.    An endoscopic exam, done with a long tube with a camera on the end. The tube is passed through your mouth into your stomach, and allows the healthcare provider to get a closer look at your ulcer. You will be lightly sedated for this procedure. Your healthcare provider can also take a tissue sample to test for H. pylori.    Blood, stool, and breath tests are also available to show whether you have H. pylori in your digestive  tract.  Your treatment  To kill H. pylori so your ulcer can heal, your healthcare provider will probably prescribe antibiotics. Other ulcer medicines that help reduce stomach acid may also be prescribed as well. Testing after treatment may be recommended to be sure the H. pylori infection is gone. Usually, killing H. pylori helps keep the ulcer from returning. However, you can develop ulcers more than once in your lifetime.   Date Last Reviewed: 7/1/2016 2000-2017 [employer]. [address] [place] [address], [place] [address]. All rights reserved. This information is not intended as a substitute for professional medical care. Always follow your healthcare professional's instructions.

## 2018-08-31 ENCOUNTER — TELEPHONE (OUTPATIENT)
Dept: URGENT CARE | Facility: URGENT CARE | Age: 38
End: 2018-08-31

## 2018-08-31 LAB — UREA BREATH TEST QL: NEGATIVE

## 2018-08-31 NOTE — TELEPHONE ENCOUNTER
This writer attempted to contact [facility] on 08/31/18      Reason for call I called and left VM states lab results came back normal and to follow up with pcp if symptoms persist  and left detailed message.      If patient calls back:   Patient contacted by Urgent Care. Inform patient that someone from the team will contact them, document that pt called and route to care team.     James CMA

## 2019-01-03 ENCOUNTER — APPOINTMENT (OUTPATIENT)
Dept: ULTRASOUND IMAGING | Facility: CLINIC | Age: 39
End: 2019-01-03
Payer: COMMERCIAL

## 2019-01-03 ENCOUNTER — HOSPITAL ENCOUNTER (EMERGENCY)
Facility: CLINIC | Age: 39
Discharge: HOME | End: 2019-01-03
Attending: EMERGENCY MEDICINE | Admitting: EMERGENCY MEDICINE
Payer: COMMERCIAL

## 2019-01-03 VITALS
WEIGHT: 148 LBS | DIASTOLIC BLOOD PRESSURE: 95 MMHG | HEART RATE: 80 BPM | OXYGEN SATURATION: 98 % | BODY MASS INDEX: 23.78 KG/M2 | TEMPERATURE: 98.1 F | SYSTOLIC BLOOD PRESSURE: 126 MMHG | HEIGHT: 66 IN | RESPIRATION RATE: 16 BRPM

## 2019-01-03 DIAGNOSIS — R10.84 ABDOMINAL PAIN, GENERALIZED: ICD-10-CM

## 2019-01-03 LAB
ALBUMIN SERPL-MCNC: 3.8 G/DL (ref 3.4–5)
ALBUMIN UR-MCNC: NEGATIVE MG/DL
ALP SERPL-CCNC: 76 U/L (ref 40–150)
ALT SERPL W P-5'-P-CCNC: 26 U/L (ref 0–70)
ANION GAP SERPL CALCULATED.3IONS-SCNC: 7 MMOL/L (ref 3–14)
APPEARANCE UR: CLEAR
AST SERPL W P-5'-P-CCNC: 16 U/L (ref 0–45)
BASOPHILS # BLD AUTO: 0 10E9/L (ref 0–0.2)
BASOPHILS NFR BLD AUTO: 0.3 %
BILIRUB SERPL-MCNC: 0.3 MG/DL (ref 0.2–1.3)
BILIRUB UR QL STRIP: NEGATIVE
BUN SERPL-MCNC: 13 MG/DL (ref 7–30)
CALCIUM SERPL-MCNC: 8.8 MG/DL (ref 8.5–10.1)
CHLORIDE SERPL-SCNC: 103 MMOL/L (ref 94–109)
CO2 SERPL-SCNC: 28 MMOL/L (ref 20–32)
COLOR UR AUTO: ABNORMAL
CREAT SERPL-MCNC: 0.84 MG/DL (ref 0.66–1.25)
DIFFERENTIAL METHOD BLD: NORMAL
EOSINOPHIL # BLD AUTO: 0.3 10E9/L (ref 0–0.7)
EOSINOPHIL NFR BLD AUTO: 3.1 %
ERYTHROCYTE [DISTWIDTH] IN BLOOD BY AUTOMATED COUNT: 12.8 % (ref 10–15)
GFR SERPL CREATININE-BSD FRML MDRD: >90 ML/MIN/{1.73_M2}
GLUCOSE SERPL-MCNC: 102 MG/DL (ref 70–99)
GLUCOSE UR STRIP-MCNC: NEGATIVE MG/DL
HCT VFR BLD AUTO: 42.5 % (ref 40–53)
HGB BLD-MCNC: 14.7 G/DL (ref 13.3–17.7)
HGB UR QL STRIP: NEGATIVE
HYALINE CASTS #/AREA URNS LPF: 1 /LPF (ref 0–2)
IMM GRANULOCYTES # BLD: 0 10E9/L (ref 0–0.4)
IMM GRANULOCYTES NFR BLD: 0 %
INTERPRETATION ECG - MUSE: NORMAL
KETONES UR STRIP-MCNC: NEGATIVE MG/DL
LEUKOCYTE ESTERASE UR QL STRIP: NEGATIVE
LIPASE SERPL-CCNC: 175 U/L (ref 73–393)
LYMPHOCYTES # BLD AUTO: 3.1 10E9/L (ref 0.8–5.3)
LYMPHOCYTES NFR BLD AUTO: 29.6 %
MCH RBC QN AUTO: 31.1 PG (ref 26.5–33)
MCHC RBC AUTO-ENTMCNC: 34.6 G/DL (ref 31.5–36.5)
MCV RBC AUTO: 90 FL (ref 78–100)
MONOCYTES # BLD AUTO: 1 10E9/L (ref 0–1.3)
MONOCYTES NFR BLD AUTO: 9.6 %
MUCOUS THREADS #/AREA URNS LPF: PRESENT /LPF
NEUTROPHILS # BLD AUTO: 6 10E9/L (ref 1.6–8.3)
NEUTROPHILS NFR BLD AUTO: 57.4 %
NITRATE UR QL: NEGATIVE
NRBC # BLD AUTO: 0 10*3/UL
NRBC BLD AUTO-RTO: 0 /100
PH UR STRIP: 7 PH (ref 5–7)
PLATELET # BLD AUTO: 195 10E9/L (ref 150–450)
POTASSIUM SERPL-SCNC: 3.9 MMOL/L (ref 3.4–5.3)
PROT SERPL-MCNC: 8.4 G/DL (ref 6.8–8.8)
RBC # BLD AUTO: 4.73 10E12/L (ref 4.4–5.9)
RBC #/AREA URNS AUTO: 0 /HPF (ref 0–2)
SODIUM SERPL-SCNC: 138 MMOL/L (ref 133–144)
SOURCE: ABNORMAL
SP GR UR STRIP: 1.01 (ref 1–1.03)
TROPONIN I SERPL-MCNC: 0.03 UG/L (ref 0–0.04)
UROBILINOGEN UR STRIP-MCNC: NORMAL MG/DL (ref 0–2)
WBC # BLD AUTO: 10.4 10E9/L (ref 4–11)
WBC #/AREA URNS AUTO: 0 /HPF (ref 0–5)

## 2019-01-03 PROCEDURE — 76705 ECHO EXAM OF ABDOMEN: CPT | Mod: XS

## 2019-01-03 PROCEDURE — 85025 COMPLETE CBC W/AUTO DIFF WBC: CPT | Performed by: EMERGENCY MEDICINE

## 2019-01-03 PROCEDURE — 81001 URINALYSIS AUTO W/SCOPE: CPT | Performed by: EMERGENCY MEDICINE

## 2019-01-03 PROCEDURE — 84484 ASSAY OF TROPONIN QUANT: CPT | Performed by: EMERGENCY MEDICINE

## 2019-01-03 PROCEDURE — 96374 THER/PROPH/DIAG INJ IV PUSH: CPT

## 2019-01-03 PROCEDURE — 83690 ASSAY OF LIPASE: CPT | Performed by: EMERGENCY MEDICINE

## 2019-01-03 PROCEDURE — 25000125 ZZHC RX 250: Performed by: EMERGENCY MEDICINE

## 2019-01-03 PROCEDURE — 93005 ELECTROCARDIOGRAM TRACING: CPT

## 2019-01-03 PROCEDURE — 80053 COMPREHEN METABOLIC PANEL: CPT | Performed by: EMERGENCY MEDICINE

## 2019-01-03 PROCEDURE — 99285 EMERGENCY DEPT VISIT HI MDM: CPT | Mod: 25

## 2019-01-03 PROCEDURE — 76705 ECHO EXAM OF ABDOMEN: CPT | Mod: 76

## 2019-01-03 PROCEDURE — C9113 INJ PANTOPRAZOLE SODIUM, VIA: HCPCS | Performed by: EMERGENCY MEDICINE

## 2019-01-03 PROCEDURE — 25000132 ZZH RX MED GY IP 250 OP 250 PS 637: Performed by: EMERGENCY MEDICINE

## 2019-01-03 PROCEDURE — 93976 VASCULAR STUDY: CPT

## 2019-01-03 PROCEDURE — 25000128 H RX IP 250 OP 636: Performed by: EMERGENCY MEDICINE

## 2019-01-03 RX ADMIN — LIDOCAINE HYDROCHLORIDE 30 ML: 20 SOLUTION ORAL; TOPICAL at 10:02

## 2019-01-03 RX ADMIN — PANTOPRAZOLE SODIUM 40 MG: 40 INJECTION, POWDER, FOR SOLUTION INTRAVENOUS at 10:02

## 2019-01-03 SDOH — HEALTH STABILITY: MENTAL HEALTH: HOW OFTEN DO YOU HAVE A DRINK CONTAINING ALCOHOL?: NEVER

## 2019-01-03 ASSESSMENT — MIFFLIN-ST. JEOR: SCORE: 1534.07

## 2019-01-03 ASSESSMENT — ENCOUNTER SYMPTOMS
ABDOMINAL PAIN: 1
VOMITING: 0
TROUBLE SWALLOWING: 1
NAUSEA: 1
SORE THROAT: 1
SHORTNESS OF BREATH: 1

## 2019-01-03 NOTE — ED PROVIDER NOTES
History     Chief Complaint:  Abdominal Pain     The history is provided by the patient.      Darryl Melo is a 38 year old male who presents to the emergency department today for evaluation of generalized abdominal cramping. Patient reports onset of abdoimnal pain since July of 2018 that prompted him to stop drinking alcohol and soda. He describes intermittent abdominal cramping that is worse in the mornings and after bowel movements. This was initially worked up on 8/28 at  with unremarkable findings for H. Pylori. Despite this, his symptoms persisted the next several months and worsened in frequency as well. Patient presented to [facility] ED on 12/21 with negative CT abdomen/pelvis per Chart Everywhere. Patient highlights worsening of symptoms three days ago with onset of associated testicular pain as well as midline chest pain that radiates up into his throat; he describes this as a sharp and burning pain. It makes it difficult to swallow/speak. He asserts he experiences improvement of symptoms after meals. Patient acknowledges baseline nausea but denies any emesis. He states he takes Prilosec in the morning with ranitidine at night and recently started metamucil.     Allergies:  No Known Drug Allergies    Medications:    Prilosec  Simethicone  Ranitidine  Metamucil    Medications:    Medications reviewed. No current medications.     Past Medical History:    Medical history reviewed. No pertinent medical history.    Past Surgical History:    Surgical history reviewed. No pertinent surgical history.    Family History:    Family history reviewed. No pertinent family history.     Social History:  Smoking Status: Never Smoker  Smokeless Tobacco: Never Used  Alcohol Use: Negative     Review of Systems   HENT: Positive for sore throat and trouble swallowing.    Respiratory: Positive for shortness of breath.    Cardiovascular: Positive for chest pain.   Gastrointestinal: Positive for abdominal pain and nausea.  "Negative for vomiting.   Genitourinary: Positive for testicular pain.   All other systems reviewed and are negative.      Physical Exam     Patient Vitals for the past 24 hrs:   BP Temp Temp src Heart Rate Resp SpO2 Height Weight   01/03/19 0928 (!) 134/103 98.1  F (36.7  C) Oral 80 16 97 % 1.676 m (5' 6\") 67.1 kg (148 lb)     Physical Exam  General/Appearance: appears stated age, well-groomed, appears uncomfortable, tearful  Eyes: EOMI, no scleral injection, no icterus  ENT: MMM  Neck: supple, nl ROM, no stiffness  Cardiovascular: RRR, nl S1S2, no m/r/g, 2+ pulses in all 4 extremities, cap refill <2sec  Respiratory: CTAB, good air movement throughout, no wheezes/rhonchi/rales, no increased WOB, no retractions  Back: no lesions  GI: abd soft, non-distended, no focal ttp,  no HSM, no rebound, no guarding, nl BS  MSK: DUMONT, good tone, no bony abnormality  Skin: warm and well-perfused, no rash, no edema, no ecchymosis, nl turgor  Neuro: GCS 15, alert and oriented, no gross focal neuro deficits  Psych: interacts appropriately  Heme: no petechia, no purpura, no active bleeding    Emergency Department Course     ECG:  ECG taken at 1007, ECG read at 1040  Normal sinus rhythm  Right superior axis deviation  Nonspecific ST abnormality  Abnormal QRS-T angle, consider primary T wave abnormality  Abnormal ECG  Rate 72 bpm. WV interval 170 ms. QRS duration 80 ms. QT/QTc 366/400 ms. P-R-T axes 28 255 -28.    Imaging:  Radiology findings were communicated with the patient who voiced understanding of the findings.    US Testicular & Scrotum w Doppler Ltd  Unremarkable testicular ultrasound, no evidence of torsion.  DAXA CUEVAS MD  Reading per radiology    US Abdomen Limited  Unremarkable right upper quadrant ultrasound.  DAXA CUEVAS MD  Reading per radiology    US Appendix Only  Appendix is not visualized. If high clinical concern for acute  appendicitis, CT with contrast can be performed for further  evaluation.  DAXA CARRION " MD FAITH  Reading per radiology    Laboratory:  Laboratory findings were communicated with the patient who voiced understanding of the findings.    UA: mucous present (A) o/w WNL  CBC: WBC 10.4, HGB 14.7,   CMP: glucose 102 (H) o/w WNL (Creatinine 0.84)  Lipase: 175  Troponin (Collected 0937): 0.030    Interventions:  1002: Protonix 40 mg IV   1002: GI Cocktail - Maalox 15 mL, Viscous Lidocaine 15 mL, 30 mL suspension PO     Emergency Department Course:    0927 Nursing notes and vitals reviewed.    0937 I performed an exam of the patient as documented above.     0937 IV was inserted and blood was drawn for laboratory testing, results above.    1000 The patient provided a urine sample here in the emergency department. This was sent for laboratory testing, findings above.    1041 The patient was sent for an ultrasound while in the emergency department, results above.     1128 Recheck and update.    I personally reviewed the imaging and laboratory results with the patient and answered all related questions prior to discharge.    Impression & Plan      Medical Decision Making:  Darryl Melo is a 38 year old male who presents with increasing abdominal pain for the past 6 months.  Initially would occur only once every other week then it became weekly over the past several weeks to months has become daily.  He has already been on omeprazole and ranitidine for this without significant success.  Over the past several days he also has developed some testicular pain.  He was seen recently towards the end of December at [facility] in [place].  There he had a CT abdomen/pelvis which was normal.  This ruled out infection, obstruction, AAA, nephrolithiasis, hernia.  Here, with testicular pain, we obtained testicle ultrasound as well as, per patient's request, right upper quadrant ultrasound and appendix ultrasound.  Appendix was not able to be visualized but I have very low suspicion for appendicitis.  I do not  "think the radiation of a CT is indicated as he is nontender to his appendix and pain seems to be much more diffuse.  Clinically this sounds much more like IBS or reflux, especially as he describes the pain radiating from his mid abdomen up the center of his chest to his throat as a \"burning sensation.\"  It is always possible that this is gallbladder dysfunction and would benefit from a HIDA scan but I still think this should be ordered by GI and is low likelihood given the diffuse nature of the pain.  LFTs, lipase, white count were unremarkable.  Urinalysis was unremarkable.  All this was discussed in detail with the patient.  I explained that I definitely do not think he is making his pain up but I feel like I do not have any additional tools in the ER that would benefit him as far as diagnostics.  At this point I think he needs to follow-up with GI.  I given him information for [facility].  He also has an appointment with a primary care provider tomorrow.  I strongly encouraged and emphasized the need for him to go to this as they may be able to get him into GI sooner than I can.  Understandably the patient is frustrated with the situation and the lack of diagnosis but seems to understand the plan.    Diagnosis:    ICD-10-CM   1. Abdominal pain, generalized R10.84     Disposition:   The patient is discharged to home.    Scribe Disclosure:  I, Mohinder Snyder, am serving as a scribe at 9:32 AM on 1/3/2019 to document services personally performed by Radha Daly MD based on my observations and the provider's statements to me.     EMERGENCY DEPARTMENT       Radha Daly MD  01/03/19 1214    "

## 2019-01-03 NOTE — ED AVS SNAPSHOT
Emergency Department  [address] [place] [id number]  Phone:  [phone]  Fax:  [phone]                                    Darryl Melo   MRN: [id number]    Department:   Emergency Department   Date of Visit:  1/3/2019           After Visit Summary Signature Page    I have received my discharge instructions, and my questions have been answered. I have discussed any challenges I see with this plan with the nurse or doctor.    ..........................................................................................................................................  Patient/Patient Representative Signature      ..........................................................................................................................................  Patient Representative Print Name and Relationship to Patient    ..................................................               ................................................  Date                                   Time    ..........................................................................................................................................  Reviewed by Signature/Title    ...................................................              ..............................................  Date                                               Time          22EPIC Rev 08/18

## 2021-06-16 PROBLEM — F41.1 GAD (GENERALIZED ANXIETY DISORDER): Chronic | Status: ACTIVE | Noted: 2019-03-09

## 2021-06-16 PROBLEM — G47.00 INSOMNIA: Chronic | Status: ACTIVE | Noted: 2019-03-09

## 2021-06-16 PROBLEM — F33.1 MAJOR DEPRESSIVE DISORDER, RECURRENT EPISODE, MODERATE (H): Chronic | Status: ACTIVE | Noted: 2019-03-09

## 2021-06-16 PROBLEM — F39 MOOD DISORDER (H): Status: ACTIVE | Noted: 2019-03-08
